# Patient Record
Sex: FEMALE | Race: WHITE | NOT HISPANIC OR LATINO | Employment: FULL TIME | URBAN - METROPOLITAN AREA
[De-identification: names, ages, dates, MRNs, and addresses within clinical notes are randomized per-mention and may not be internally consistent; named-entity substitution may affect disease eponyms.]

---

## 2023-08-26 PROBLEM — E03.9 HYPOTHYROIDISM: Status: ACTIVE | Noted: 2023-08-26

## 2023-08-26 PROBLEM — D72.819 WBC DECREASED: Status: ACTIVE | Noted: 2023-08-26

## 2023-08-26 PROBLEM — S80.12XA CONTUSION OF LEFT LEG: Status: ACTIVE | Noted: 2023-08-26

## 2023-08-26 PROBLEM — I48.92 ATRIAL FLUTTER (MULTI): Status: ACTIVE | Noted: 2023-08-26

## 2023-08-26 PROBLEM — G47.9 SLEEP DISTURBANCES: Status: ACTIVE | Noted: 2023-08-26

## 2023-08-26 PROBLEM — R91.8 LUNG NODULES: Status: ACTIVE | Noted: 2023-08-26

## 2023-08-26 PROBLEM — K21.9 GASTROESOPHAGEAL REFLUX DISEASE: Status: ACTIVE | Noted: 2023-08-26

## 2023-08-26 PROBLEM — E55.9 VITAMIN D DEFICIENCY: Status: ACTIVE | Noted: 2023-08-26

## 2023-08-26 PROBLEM — M85.80 OSTEOPENIA: Status: ACTIVE | Noted: 2023-08-26

## 2023-08-26 PROBLEM — R06.02 SHORTNESS OF BREATH: Status: ACTIVE | Noted: 2023-08-26

## 2023-08-26 PROBLEM — I47.29 VENTRICULAR TACHYCARDIA, NON-SUSTAINED (MULTI): Status: ACTIVE | Noted: 2023-08-26
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
## 2023-08-26 PROBLEM — H27.00 APHAKIA: Status: ACTIVE | Noted: 2023-08-26

## 2023-08-26 PROBLEM — R93.89 ABNORMAL PELVIC ULTRASOUND: Status: ACTIVE | Noted: 2023-08-26

## 2023-08-26 PROBLEM — R59.1 LYMPHADENOPATHY: Status: ACTIVE | Noted: 2023-08-26

## 2023-08-26 PROBLEM — R91.8 LUNG INFILTRATE ON CT: Status: ACTIVE | Noted: 2023-08-26

## 2023-08-26 PROBLEM — S90.31XA CONTUSION OF RIGHT FOOT: Status: ACTIVE | Noted: 2023-08-26

## 2023-08-26 PROBLEM — A05.9 FOOD POISONING: Status: ACTIVE | Noted: 2023-08-26

## 2023-08-26 PROBLEM — N95.1 POSTMENOPAUSAL DISORDER: Status: ACTIVE | Noted: 2023-08-26

## 2023-08-26 PROBLEM — H31.002 CHORIORETINAL SCAR OF LEFT EYE: Status: ACTIVE | Noted: 2023-08-26

## 2023-08-26 PROBLEM — E05.90 HYPERTHYROIDISM: Status: ACTIVE | Noted: 2023-08-26

## 2023-08-26 PROBLEM — S42.453A: Status: ACTIVE | Noted: 2023-08-26

## 2023-08-26 PROBLEM — E78.00 HYPERCHOLESTEREMIA: Status: ACTIVE | Noted: 2023-08-26

## 2023-08-26 PROBLEM — R91.8 ABNORMAL FINDING ON LUNG IMAGING: Status: ACTIVE | Noted: 2023-08-26
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
## 2023-08-26 PROBLEM — R59.0 MEDIASTINAL LYMPHADENOPATHY: Status: ACTIVE | Noted: 2023-08-26

## 2023-08-26 PROBLEM — R79.9 ABNORMAL BLOOD CHEMISTRY: Status: ACTIVE | Noted: 2023-08-26

## 2023-08-26 PROBLEM — D86.9 SARCOIDOSIS: Status: ACTIVE | Noted: 2023-08-26

## 2023-08-26 PROBLEM — M25.474 SWELLING OF FOOT JOINT, RIGHT: Status: ACTIVE | Noted: 2023-08-26

## 2023-08-26 RX ORDER — IBANDRONATE SODIUM 150 MG/1
1 TABLET, FILM COATED ORAL
COMMUNITY
Start: 2016-07-12

## 2023-08-26 RX ORDER — ERGOCALCIFEROL 1.25 MG/1
1 CAPSULE ORAL
COMMUNITY
Start: 2017-08-29

## 2023-08-26 RX ORDER — NITROFURANTOIN (MACROCRYSTALS) 100 MG/1
1 CAPSULE ORAL EVERY 12 HOURS
COMMUNITY
Start: 2018-03-16

## 2023-08-26 RX ORDER — OMEPRAZOLE 40 MG/1
1 CAPSULE, DELAYED RELEASE ORAL DAILY
COMMUNITY
Start: 2014-10-02

## 2023-08-26 RX ORDER — METOPROLOL SUCCINATE 25 MG/1
1 TABLET, EXTENDED RELEASE ORAL DAILY
COMMUNITY
Start: 2015-12-15

## 2023-08-26 RX ORDER — TRAZODONE HYDROCHLORIDE 50 MG/1
1 TABLET ORAL NIGHTLY
COMMUNITY
Start: 2017-11-30

## 2023-08-26 RX ORDER — OSELTAMIVIR PHOSPHATE 75 MG/1
1 CAPSULE ORAL 2 TIMES DAILY
COMMUNITY
Start: 2018-03-16

## 2023-08-26 RX ORDER — LEVOTHYROXINE SODIUM 112 UG/1
1 TABLET ORAL DAILY
COMMUNITY
Start: 2016-02-22

## 2023-10-27 ENCOUNTER — APPOINTMENT (OUTPATIENT)
Dept: OPHTHALMOLOGY | Facility: CLINIC | Age: 73
End: 2023-10-27
Payer: MEDICARE

## 2023-10-27 ENCOUNTER — OFFICE VISIT (OUTPATIENT)
Dept: OPHTHALMOLOGY | Facility: CLINIC | Age: 73
End: 2023-10-27
Payer: MEDICARE

## 2023-10-27 ENCOUNTER — CLINICAL SUPPORT (OUTPATIENT)
Dept: OPHTHALMOLOGY | Facility: CLINIC | Age: 73
End: 2023-10-27
Payer: MEDICARE

## 2023-10-27 DIAGNOSIS — H27.02 APHAKIA OF LEFT EYE: Primary | ICD-10-CM

## 2023-10-27 DIAGNOSIS — H31.002 CHORIORETINAL SCAR OF LEFT EYE: ICD-10-CM

## 2023-10-27 DIAGNOSIS — H52.31 ANISOMETROPIA: ICD-10-CM

## 2023-10-27 DIAGNOSIS — H52.7 REFRACTION ERROR: ICD-10-CM

## 2023-10-27 DIAGNOSIS — H50.112 MONOCULAR EXOTROPIA, LEFT EYE: ICD-10-CM

## 2023-10-27 DIAGNOSIS — H11.151 PINGUECULA OF RIGHT EYE: ICD-10-CM

## 2023-10-27 PROBLEM — H11.159 PINGUECULA: Status: ACTIVE | Noted: 2023-10-27

## 2023-10-27 PROCEDURE — 1159F MED LIST DOCD IN RCRD: CPT | Performed by: OPHTHALMOLOGY

## 2023-10-27 PROCEDURE — 1036F TOBACCO NON-USER: CPT | Performed by: OPHTHALMOLOGY

## 2023-10-27 PROCEDURE — 1126F AMNT PAIN NOTED NONE PRSNT: CPT | Performed by: OPHTHALMOLOGY

## 2023-10-27 PROCEDURE — 99214 OFFICE O/P EST MOD 30 MIN: CPT | Performed by: OPHTHALMOLOGY

## 2023-10-27 PROCEDURE — 92015 DETERMINE REFRACTIVE STATE: CPT | Performed by: OPHTHALMOLOGY

## 2023-10-27 RX ORDER — GLUCOSAMINE/CHONDRO SU A 500-400 MG
1 TABLET ORAL DAILY
COMMUNITY

## 2023-10-27 RX ORDER — ESTRADIOL 0.1 MG/G
2 CREAM VAGINAL DAILY
COMMUNITY
Start: 2022-10-24

## 2023-10-27 ASSESSMENT — VISUAL ACUITY
OS_SC: CF AT 3'
METHOD: SNELLEN - LINEAR
OD_CC: J2
OD_CC: 20/20

## 2023-10-27 ASSESSMENT — KERATOMETRY
OS_K1POWER_DIOPTERS: 44.75
OS_K2POWER_DIOPTERS: 46.50
OS_AXISANGLE_DEGREES: 85
OD_AXISANGLE2_DEGREES: 175
METHOD_AUTO_MANUAL: AUTOMATED
OS_AXISANGLE2_DEGREES: 175
OD_K1POWER_DIOPTERS: 45.50
OD_K2POWER_DIOPTERS: 47.25
OD_AXISANGLE_DEGREES: 85

## 2023-10-27 ASSESSMENT — ENCOUNTER SYMPTOMS
HEMATOLOGIC/LYMPHATIC NEGATIVE: 0
PSYCHIATRIC NEGATIVE: 0
OCCASIONAL FEELINGS OF UNSTEADINESS: 0
CONSTITUTIONAL NEGATIVE: 0
EYES NEGATIVE: 0
LOSS OF SENSATION IN FEET: 0
ALLERGIC/IMMUNOLOGIC NEGATIVE: 0
CARDIOVASCULAR NEGATIVE: 0
GASTROINTESTINAL NEGATIVE: 0
RESPIRATORY NEGATIVE: 0
ENDOCRINE NEGATIVE: 0
NEUROLOGICAL NEGATIVE: 0
DEPRESSION: 0
MUSCULOSKELETAL NEGATIVE: 0

## 2023-10-27 ASSESSMENT — REFRACTION_MANIFEST
OS_CYLINDER: -0.75
OS_AXIS: 135
OS_SPHERE: +10.50
OD_AXIS: 165
OD_CYLINDER: -1.75
OD_SPHERE: -3.25

## 2023-10-27 ASSESSMENT — EXTERNAL EXAM - LEFT EYE: OS_EXAM: 1+ BROW PTOSIS

## 2023-10-27 ASSESSMENT — PATIENT HEALTH QUESTIONNAIRE - PHQ9
2. FEELING DOWN, DEPRESSED OR HOPELESS: NOT AT ALL
1. LITTLE INTEREST OR PLEASURE IN DOING THINGS: NOT AT ALL
SUM OF ALL RESPONSES TO PHQ9 QUESTIONS 1 AND 2: 0

## 2023-10-27 ASSESSMENT — REFRACTION_CURRENTRX
OD_DIAMETER: 9.5
OD_BASECURVE: 7.40
OD_SPHERE: -4.25
OD_BASECURVE: 7.40
OD_DIAMETER: 9.5
OD_SPHERE: -3.00
OD_ADD: +2.50

## 2023-10-27 ASSESSMENT — PAIN SCALES - GENERAL: PAINLEVEL: 0-NO PAIN

## 2023-10-27 ASSESSMENT — SLIT LAMP EXAM - LIDS
COMMENTS: 1+ BLEPHARITIS, 1+ DERMATOCHALASIS - UPPER LID
COMMENTS: 1+ BLEPHARITIS, 1+ DERMATOCHALASIS - UPPER LID

## 2023-10-27 ASSESSMENT — CUP TO DISC RATIO
OD_RATIO: 0.3
OS_RATIO: 0.3

## 2023-10-27 ASSESSMENT — REFRACTION_WEARINGRX
OD_SPHERE: -3.50
OD_AXIS: 125
OD_CYLINDER: -0.50

## 2023-10-27 ASSESSMENT — TONOMETRY
OS_IOP_MMHG: 14
IOP_METHOD: GOLDMANN APPLANATION
OD_IOP_MMHG: 11

## 2023-10-27 ASSESSMENT — EXTERNAL EXAM - RIGHT EYE: OD_EXAM: 1+ BROW PTOSIS

## 2023-10-27 NOTE — PROGRESS NOTES
Subjective   Patient ID: Jasmyne Allen is a 73 y.o. female.    Chief Complaint    Annual Exam       HPI    No changes in visual acuity (VA) since last visit    Complete and cl exam.  No new changes in health history or meds.  Vision is good and stable. No new problems or complaints.    Last edited by Sacha Figueroa MD on 10/27/2023 12:18 PM.        No current outpatient medications on file. (Ophthalmology pharm classes)       Current Outpatient Medications (Other)   Medication Sig Dispense Refill    ergocalciferol (Vitamin D-2) 1.25 MG (56802 UT) capsule Take 1 capsule (1,250 mcg) by mouth 1 (one) time per week.      glucosamine-chondroitin 500-400 mg tablet Take 1 tablet by mouth once daily.      levothyroxine (Synthroid, Levoxyl) 112 mcg tablet Take 1 tablet (112 mcg) by mouth once daily.      metoprolol succinate XL (Toprol-XL) 25 mg 24 hr tablet Take 1 tablet (25 mg) by mouth once daily.      ASPIRIN ORAL Take by mouth.      estradiol (Estrace) 0.01 % (0.1 mg/gram) vaginal cream Insert 0.5 Applicatorfuls (2 g) into the vagina once daily.      ibandronate (Boniva) 150 mg tablet Take 1 tablet (150 mg) by mouth every 30 (thirty) days.      nitrofurantoin (Macrodantin) 100 mg capsule Take 1 capsule (100 mg) by mouth every 12 hours.      omeprazole (PriLOSEC) 40 mg DR capsule Take 1 capsule (40 mg) by mouth once daily.      oseltamivir (Tamiflu) 75 mg capsule Take 1 capsule (75 mg) by mouth twice a day. With meals      traZODone (Desyrel) 50 mg tablet Take 1 tablet (50 mg) by mouth once daily at bedtime.         Objective   Base Eye Exam       Visual Acuity (Snellen - Linear)         Right Left    Dist sc  CF at 3'    Dist cc 20/20     Near cc J2               Tonometry (Goldmann Applanation, 11:47 AM)         Right Left    Pressure 11 14              Pupils         Dark Shape React APD    Right 4 Round 2 None    Left 4 Irregular 1 None              Extraocular Movement         Right Left     0 0 0   0  0   0  0 0    -1 -2 -2   --  -2   -- -- -2                 Dilation       Both eyes: 1% Tropic 2.5% Phen @ 11:47 AM                  Additional Tests       Keratometry (Automated)         K1 Axis K2 Axis    Right 45.50 175 47.25 85    Left 44.75 175 46.50 85                  Slit Lamp and Fundus Exam       External Exam         Right Left    External 1+ Brow ptosis 1+ Brow ptosis              Slit Lamp Exam         Right Left    Lids/Lashes 1+ Blepharitis, 1+ Dermatochalasis - upper lid 1+ Blepharitis, 1+ Dermatochalasis - upper lid    Conjunctiva/Sclera pinguecula pinguecula    Cornea normal epi/stroma/endo and tear film normal epi/stroma/endo and tear film    Anterior Chamber normal anterior chamber, deep and quiet normal anterior chamber, deep and quiet    Iris iris normal irregular iris    Lens 2+ nuclear sclerosis, 1+ Cortical cataract aphakic    Anterior Vitreous vitreous syneresis vitreous syneresis              Fundus Exam         Right Left    Disc normal optic nerve normal optic nerve    C/D Ratio 0.3 0.3    Macula normal macula normal macula    Vessels normal vessels normal vessels    Periphery no change since last exam no change since last exam                  Refraction       Wearing Rx         Sphere Cylinder Axis    Right -3.50 -0.50 125    Left                 Manifest Refraction         Sphere Cylinder Axis    Right -3.25 -1.75 165    Left +10.50 -0.75 135              Final Rx         Sphere Cylinder [place] Dist VA Add Near VA    Right -3.25 -0.75 180 20/20 +2.75 20/20    Left -3.25 -0.75 180 20/20 +2.75 20/20      Type: PROGRESSIVE    Expiration Date: 10/27/2025    Comments: BALANCE OS    Pupillary Distance: 55                    Assessment/Plan   Problem List Items Addressed This Visit          Eye/Vision problems    Aphakia - Primary    Chorioretinal scar of left eye    Monocular exotropia, left eye    Anisometropia    Pinguecula    Refraction error

## 2024-11-04 ENCOUNTER — APPOINTMENT (OUTPATIENT)
Dept: OPHTHALMOLOGY | Facility: CLINIC | Age: 74
End: 2024-11-04
Payer: MEDICARE

## 2024-11-04 DIAGNOSIS — H52.31 ANISOMETROPIA: ICD-10-CM

## 2024-11-04 DIAGNOSIS — H31.002 CHORIORETINAL SCAR OF LEFT EYE: ICD-10-CM

## 2024-11-04 DIAGNOSIS — H11.153 PINGUECULA OF BOTH EYES: ICD-10-CM

## 2024-11-04 DIAGNOSIS — H52.7 REFRACTION ERROR: ICD-10-CM

## 2024-11-04 DIAGNOSIS — Z97.3 WEARS CONTACT LENSES: ICD-10-CM

## 2024-11-04 DIAGNOSIS — H50.112 MONOCULAR EXOTROPIA, LEFT EYE: ICD-10-CM

## 2024-11-04 DIAGNOSIS — H27.02 APHAKIA OF LEFT EYE: Primary | ICD-10-CM

## 2024-11-04 PROCEDURE — 99214 OFFICE O/P EST MOD 30 MIN: CPT | Performed by: OPHTHALMOLOGY

## 2024-11-04 PROCEDURE — 92015 DETERMINE REFRACTIVE STATE: CPT | Mod: MUE | Performed by: OPHTHALMOLOGY

## 2024-11-04 PROCEDURE — FLVLF CONTACT LENS EVALUATION (SP): Performed by: OPHTHALMOLOGY

## 2024-11-04 PROCEDURE — 92015 DETERMINE REFRACTIVE STATE: CPT | Performed by: OPHTHALMOLOGY

## 2024-11-04 ASSESSMENT — REFRACTION_MANIFEST
METHOD_AUTOREFRACTION: 1
OS_CYLINDER: -1.25
OS_AXIS: 160
OD_CYLINDER: -1.75
OD_AXIS: 180
OD_SPHERE: -3.00
OS_SPHERE: +10.25

## 2024-11-04 ASSESSMENT — TONOMETRY
OD_IOP_MMHG: 10
IOP_METHOD: GOLDMANN APPLANATION
OS_IOP_MMHG: 12

## 2024-11-04 ASSESSMENT — REFRACTION_CURRENTRX
OD_BASECURVE: 7.40
OD_BASECURVE: 7.40
OD_ADD: +2.50
OD_SPHERE: -3.00
OD_DIAMETER: 9.5
OD_SPHERE: -4.25
OD_DIAMETER: 9.5

## 2024-11-04 ASSESSMENT — EXTERNAL EXAM - LEFT EYE: OS_EXAM: 1+ BROW PTOSIS

## 2024-11-04 ASSESSMENT — VISUAL ACUITY
OD_CC: 20/20
METHOD: SNELLEN - SINGLE
OD_CC: J1
CORRECTION_TYPE: CONTACTS
OS_CC: CF AT 3'

## 2024-11-04 ASSESSMENT — PATIENT HEALTH QUESTIONNAIRE - PHQ9
SUM OF ALL RESPONSES TO PHQ9 QUESTIONS 1 AND 2: 0
1. LITTLE INTEREST OR PLEASURE IN DOING THINGS: NOT AT ALL
2. FEELING DOWN, DEPRESSED OR HOPELESS: NOT AT ALL

## 2024-11-04 ASSESSMENT — ENCOUNTER SYMPTOMS
ENDOCRINE NEGATIVE: 0
MUSCULOSKELETAL NEGATIVE: 0
CARDIOVASCULAR NEGATIVE: 0
NEUROLOGICAL NEGATIVE: 0
RESPIRATORY NEGATIVE: 0
PSYCHIATRIC NEGATIVE: 0
EYES NEGATIVE: 0
GASTROINTESTINAL NEGATIVE: 0
HEMATOLOGIC/LYMPHATIC NEGATIVE: 0
CONSTITUTIONAL NEGATIVE: 0
ALLERGIC/IMMUNOLOGIC NEGATIVE: 0

## 2024-11-04 ASSESSMENT — CUP TO DISC RATIO
OD_RATIO: 0.3
OS_RATIO: 0.3

## 2024-11-04 ASSESSMENT — EXTERNAL EXAM - RIGHT EYE: OD_EXAM: 1+ BROW PTOSIS

## 2024-11-04 ASSESSMENT — KERATOMETRY
OD_AXISANGLE_DEGREES: 95
OD_K1POWER_DIOPTERS: 45.50
OD_K2POWER_DIOPTERS: 47.25
OS_K2POWER_DIOPTERS: 46.50
OS_AXISANGLE2_DEGREES: 140
OS_K1POWER_DIOPTERS: 45.00
OS_AXISANGLE_DEGREES: 50
METHOD_AUTO_MANUAL: AUTOMATED
OD_AXISANGLE2_DEGREES: 5

## 2024-11-04 ASSESSMENT — PAIN SCALES - GENERAL: PAINLEVEL_OUTOF10: 0-NO PAIN

## 2024-11-04 NOTE — PROGRESS NOTES
Subjective   Patient ID: Jasmyne Allen is a 74 y.o. female.    Chief Complaint    Annual Exam       HPI    Not happy with last rigid gas permeable (RGP) ordered.   Last edited by ALANNA Tabares on 11/4/2024 10:05 AM.        No current outpatient medications on file. (Ophthalmology pharm classes)       Current Outpatient Medications (Other)   Medication Sig Dispense Refill    ASPIRIN ORAL Take by mouth.      ergocalciferol (Vitamin D-2) 1.25 MG (98970 UT) capsule Take 1 capsule (1,250 mcg) by mouth 1 (one) time per week.      estradiol (Estrace) 0.01 % (0.1 mg/gram) vaginal cream Insert 0.5 Applicatorfuls (2 g) into the vagina once daily.      glucosamine-chondroitin 500-400 mg tablet Take 1 tablet by mouth once daily.      ibandronate (Boniva) 150 mg tablet Take 1 tablet (150 mg) by mouth every 30 (thirty) days.      levothyroxine (Synthroid, Levoxyl) 112 mcg tablet Take 1 tablet (112 mcg) by mouth once daily.      metoprolol succinate XL (Toprol-XL) 25 mg 24 hr tablet Take 1 tablet (25 mg) by mouth once daily.      nitrofurantoin (Macrodantin) 100 mg capsule Take 1 capsule (100 mg) by mouth every 12 hours.      omeprazole (PriLOSEC) 40 mg DR capsule Take 1 capsule (40 mg) by mouth once daily.      oseltamivir (Tamiflu) 75 mg capsule Take 1 capsule (75 mg) by mouth twice a day. With meals      tirzepatide 5 mg/0.5 mL pen injector Inject under the skin.      traZODone (Desyrel) 50 mg tablet Take 1 tablet (50 mg) by mouth once daily at bedtime.         Objective   Base Eye Exam       Visual Acuity (Snellen - Single)         Right Left    Dist cc 20/20 CF at 3'    Near cc J1       Correction: Contacts              Tonometry (Goldmann Applanation, 10:21 AM)         Right Left    Pressure 10 12              Pupils         Dark Shape React APD    Right 4 Round 1 None    Left 4 Round 1 None              Extraocular Movement         Right Left     Full Full              Dilation       Both eyes: 1% Tropic 2.5%  Phen @ 10:21 AM                  Additional Tests       Keratometry (Automated)         K1 Axis K2 Axis    Right 45.50 5 47.25 95    Left 45.00 140 46.50 50                  Slit Lamp and Fundus Exam       External Exam         Right Left    External 1+ Brow ptosis 1+ Brow ptosis              Slit Lamp Exam         Right Left    Lids/Lashes 1+ Blepharitis, 1+ Dermatochalasis - upper lid 1+ Blepharitis, 1+ Dermatochalasis - upper lid    Conjunctiva/Sclera pinguecula pinguecula    Cornea normal epi/stroma/endo and tear film normal epi/stroma/endo and tear film    Anterior Chamber normal anterior chamber, deep and quiet normal anterior chamber, deep and quiet    Iris iris normal irregular iris    Lens 2+ nuclear sclerosis, 1+ Cortical cataract aphakic    Anterior Vitreous vitreous syneresis vitreous syneresis              Fundus Exam         Right Left    Disc normal optic nerve normal optic nerve    C/D Ratio 0.3 0.3    Macula normal macula normal macula    Vessels normal vessels normal vessels    Periphery no change since last exam no change since last exam                  Refraction       Manifest Refraction (Auto)         Sphere Cylinder Axis    Right -3.00 -1.75 180    Left +10.25 -1.25 160      Pupillary Distance: 54              Final Rx         Sphere Cylinder [place] Dist VA Add Near VA    Right -3.25 -0.50 120 20/30 +3.00 J1    Left -3.25 -0.50 120 CF +3.00 CF      Type: PAL    Expiration Date: 11/4/2026    Comments: Balance OS    Pupillary Distance: 54                  Contact Lens Exam       Current Contact Lens Rx         Brand Base Curve Diameter Sphere Add PC1 PC2    Right Lifestyle MTO 7.40 9.5 -4.25 +2.50      Left             Comments: Have another multifocal rigid gas permeable (RGP) made. Pt will call if not working and will have re-made if necessary.              Current Contact Lens Rx #2         Brand Base Curve Diameter Sphere Add PC1 PC2    Right F-Perm 60 Brown 7.40 9.5 -3.00  8.00 / .2mm 9.00 /  .20 mm    Left             Comments: Edge bevel 11.00 .4mm                Keratometry (Automated)         K1 Axis K2 Axis    Right 45.50 5 47.25 95    Left 45.00 140 46.50 50                    Assessment/Plan   Problem List Items Addressed This Visit          Eye/Vision problems    Aphakia of left eye - Primary     F/u one year full with cl exam.           Chorioretinal scar of left eye    Monocular exotropia, left eye    Anisometropia    Pinguecula of both eyes    Refraction error    Wears contact lenses

## 2024-12-12 ENCOUNTER — APPOINTMENT (OUTPATIENT)
Dept: OPHTHALMOLOGY | Facility: CLINIC | Age: 74
End: 2024-12-12
Payer: MEDICARE

## 2025-11-07 ENCOUNTER — APPOINTMENT (OUTPATIENT)
Dept: OPHTHALMOLOGY | Facility: CLINIC | Age: 75
End: 2025-11-07
Payer: MEDICARE